# Patient Record
Sex: MALE | URBAN - METROPOLITAN AREA
[De-identification: names, ages, dates, MRNs, and addresses within clinical notes are randomized per-mention and may not be internally consistent; named-entity substitution may affect disease eponyms.]

---
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
## 2024-02-26 NOTE — HPI: PIMPLES (ACNE)
Patient is Having lower back pain going down to right leg, the same symptons he had b-4 emergency surgey [id number] Spoke with JOSE A Mauricio on 2/15/24 . Wanted to be seen today advise  patient both Dr. Dinero and Mukund and are in surgery today, did schedule an appointment for 2/27/24 but wanted to speak with someone since he was considering going back to the ER   
What Type Of Note Output Would You Prefer (Optional)?: Standard Output
How Severe Is Your Acne?: mild
Is This A New Presentation, Or A Follow-Up?: Acne